# Patient Record
(demographics unavailable — no encounter records)

---

## 2024-10-24 NOTE — PHYSICAL EXAM
[de-identified] : Facial examination reveals Landrum type 2 skin. Static and dynamic forehead wrinkles. Loss of midface volume with tissue descent. Mild jowling. Fullness in submentum. No platysmal banding.

## 2024-10-24 NOTE — HISTORY OF PRESENT ILLNESS
[FreeTextEntry1] : Reece is a 41 year old female who presents today to discuss facial rejuvenation. She is dissatisfied with the wrinkles of her forehead as well as the fullness in her submentum and loss of midface volume. She is deciding between filler and botox today versus a face and neck lift. She is healthy and takes no medication. She lives in [place] with her  and 3 children. 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 She is a non-smoker.

## 2024-10-24 NOTE — ASSESSMENT
[FreeTextEntry1] : Reece is a 41 year old female who presents today to discuss facial rejuvenation. We had a long discussion today about the benefits of filler and botox versus facelift. She has decided that she would like to consider a facelift. She will reach out to discuss possibly moving forward.